# Patient Record
Sex: FEMALE | Race: WHITE | Employment: UNEMPLOYED | URBAN - METROPOLITAN AREA
[De-identification: names, ages, dates, MRNs, and addresses within clinical notes are randomized per-mention and may not be internally consistent; named-entity substitution may affect disease eponyms.]

---

## 2021-12-17 ENCOUNTER — OFFICE VISIT (OUTPATIENT)
Dept: ORTHOPEDIC SURGERY | Age: 41
End: 2021-12-17
Payer: MEDICAID

## 2021-12-17 VITALS
BODY MASS INDEX: 32.3 KG/M2 | HEIGHT: 70 IN | WEIGHT: 225.6 LBS | HEART RATE: 107 BPM | TEMPERATURE: 97 F | OXYGEN SATURATION: 98 %

## 2021-12-17 DIAGNOSIS — M25.551 RIGHT HIP PAIN: ICD-10-CM

## 2021-12-17 DIAGNOSIS — M54.50 LUMBAR PAIN: ICD-10-CM

## 2021-12-17 DIAGNOSIS — M70.61 TROCHANTERIC BURSITIS OF RIGHT HIP: Primary | ICD-10-CM

## 2021-12-17 PROCEDURE — 72170 X-RAY EXAM OF PELVIS: CPT | Performed by: PHYSICIAN ASSISTANT

## 2021-12-17 PROCEDURE — 20611 DRAIN/INJ JOINT/BURSA W/US: CPT | Performed by: PHYSICIAN ASSISTANT

## 2021-12-17 PROCEDURE — 99204 OFFICE O/P NEW MOD 45 MIN: CPT | Performed by: PHYSICIAN ASSISTANT

## 2021-12-17 PROCEDURE — 72100 X-RAY EXAM L-S SPINE 2/3 VWS: CPT | Performed by: PHYSICIAN ASSISTANT

## 2021-12-17 RX ORDER — BETAMETHASONE SODIUM PHOSPHATE AND BETAMETHASONE ACETATE 3; 3 MG/ML; MG/ML
6 INJECTION, SUSPENSION INTRA-ARTICULAR; INTRALESIONAL; INTRAMUSCULAR; SOFT TISSUE ONCE
Status: COMPLETED | OUTPATIENT
Start: 2021-12-17 | End: 2021-12-17

## 2021-12-17 RX ORDER — METHYLPREDNISOLONE 4 MG/1
TABLET ORAL
Qty: 1 DOSE PACK | Refills: 0 | Status: CANCELLED | OUTPATIENT
Start: 2021-12-17

## 2021-12-17 RX ORDER — MELOXICAM 15 MG/1
15 TABLET ORAL DAILY
Qty: 30 TABLET | Refills: 2 | Status: SHIPPED | OUTPATIENT
Start: 2021-12-17 | End: 2022-01-16

## 2021-12-17 RX ADMIN — BETAMETHASONE SODIUM PHOSPHATE AND BETAMETHASONE ACETATE 6 MG: 3; 3 INJECTION, SUSPENSION INTRA-ARTICULAR; INTRALESIONAL; INTRAMUSCULAR; SOFT TISSUE at 14:45

## 2021-12-17 NOTE — PROGRESS NOTES
[address] [facility] ([place], [address] [facility]  [phone]           Patient: Pierre Finney                MRN: [id number]       SSN: [id number]  YOB: 1980        AGE: 46 y.o. SEX: female  Body mass index is 32.37 kg/m². PCP: Unknown, Provider, MD  12/17/21            REVIEW OF SYSTEMS:  Constitutional: Negative for fever, chills, weight loss and malaise/fatigue. HENT: Negative. Eyes: Negative. Respiratory: Negative. Cardiovascular: Negative. Gastrointestinal: No bowel incontinence or constipation. Genitourinary: No bladder incontinence or saddle anesthesia. Skin: Negative. Neurological: Negative. Endo/Heme/Allergies: Negative. Psychiatric/Behavioral: Negative. Musculoskeletal: As per HPI above. History reviewed. No pertinent past medical history. No current outpatient medications on file. No Known Allergies    Social History     Socioeconomic History    Marital status: SINGLE     Spouse name: Not on file    Number of children: Not on file    Years of education: Not on file    Highest education level: Not on file   Occupational History    Not on file   Tobacco Use    Smoking status: Current Every Day Smoker     Packs/day: 1.00    Smokeless tobacco: Never Used   Vaping Use    Vaping Use: Never used   Substance and Sexual Activity    Alcohol use: Yes    Drug use: Not on file    Sexual activity: Not on file   Other Topics Concern    Not on file   Social History Narrative    Not on file     Social Determinants of Health     Financial Resource Strain:     Difficulty of Paying Living Expenses: Not on file   Food Insecurity:     Worried About Running Out of Food in the Last Year: Not on file    Vickie of Food in the Last Year: Not on file   Transportation Needs:     Lack of Transportation (Medical): Not on file    Lack of Transportation (Non-Medical):  Not on file   Physical Activity:     Days of Exercise per Week: Not on file    Minutes of Exercise per Session: Not on file   Stress:     Feeling of Stress : Not on file   Social Connections:     Frequency of Communication with Friends and Family: Not on file    Frequency of Social Gatherings with Friends and Family: Not on file    Attends Evangelical Services: Not on file    Active Member of Clubs or Organizations: Not on file    Attends Club or Organization Meetings: Not on file    Marital Status: Not on file   Intimate Partner Violence:     Fear of Current or Ex-Partner: Not on file    Emotionally Abused: Not on file    Physically Abused: Not on file    Sexually Abused: Not on file   Housing Stability:     Unable to Pay for Housing in the Last Year: Not on file    Number of [place] in the Last Year: Not on file    Unstable Housing in the Last Year: Not on file       History reviewed. No pertinent surgical history. Patient seen and evaluated today for pain advice regarding low back pain as well as right hip pain. She has a longstanding history of back issues. These have been worsening. She is getting discomfort into her right buttocks. She has discomfort when she is riding in her car as well as sitting down. She does have some discomfort at night which radiates down right lower extremity. No change in bowel bladder habits. She also reports having laterally based discomfort of the right hip which is worse with ambulation as well as lying on the right side at night. Denies any groin pain. No injuries. She is currently taking no anti-inflammatories. She works in [employer]. Patient denies recent fevers, chills, chest pain, SOB, or injuries. No recent systemic changes noted. A 12-point review of systems is performed today. Pertinent positives are noted. All other systems reviewed and otherwise are negative. Physical exam: General: Alert and oriented x3, nad.  well-developed, well nourished.   normal affect, AF.  NC/AT, EOMI, neck supple, trachea midline, no JVD present. Breathing is non-labored. Examination of the low back reveals skin intact. There is no erythema ecchymosis noted. There are no signs of infection or cellulitis present. She does have some discomfort to palpation in the lower lumbar spine and paraspinally. She has some discomfort slightly to the right SI joint. The pelvis is stable. There is no pain with range of motion of either hip. There is discomfort to palpation introversive the right side. Negative straight leg raise. Negative calf tenderness. Negative Homans. No signs of DVT present. Radiographs obtained the office today 12/17/2021 at the [facility] location include AP pelvis show no acute bony normalities as well as an AP and lateral of the lumbar spine showing multilevel degenerative changes and slight scoliosis. Assessment: #1 right hip trochanter bursitis #2 lumbar degenerative disc disease, radiculopathy    Plan: At this point, we discussed treatment options. We will move forward a course injection for the right hip, trochanteric bursa. After informed consent, under aseptic conditions, with US guided assitance, the right hip was prepped with betadine and a mxiture of 3ml 1% lidocaine and 6mg of celestone was injected without complications. The patient tolerated the injection well. The patient is instructed on post-injection care. We will move forward with an MRI of the lumbar spine for further evaluation of her radicular symptomatology. She has been on anti-inflammatories at home as well as done home exercise program which is failed. We will see her back after the MRI for further evaluation. We will start on Mobic 15 mg once a day with food. She is instructed on use as well as use precautions.         Chart reviewed for the following:  IMacy PA-C, have reviewed the History, Physical and updated the Allergic reactions for Stephany Soares?    TIME OUT performed immediately prior to start of procedure:  IRosa Maria PA-C, have performed the following reviews on Karthikeyan Almanza prior to the start of the procedure:  ????????  * Patient was identified by name and date of birth   * Agreement on procedure being performed was verified  * Risks and Benefits explained to the patient  * Procedure site verified and marked as necessary  * Patient was positioned for comfort  * Consent was signed and verified    Time:2:41 PM    Body part: right hip, intra-bursal    Medication & Dose: 3ml 1% lidocaine and 6mg celestone      Date of procedure: 12/17/21    Procedure performed by: Rosa Maria Gonzáles PA-C    Provider assisted by: none    Patient assisted by: self    How tolerated by patient: tolerated the procedure well with no complications    Post Procedural Pain Scale: 7    Comments:   [address] using a frequency of 10MHz with a 12L-RS transducer head was used to confirm needle placement.   Ultrasound images captured using [address] Ultrasound machine and scanned into patient's chart       Pawel Barnett PA-C, ATC

## 2022-01-03 ENCOUNTER — HOSPITAL ENCOUNTER (OUTPATIENT)
Age: 42
Discharge: HOME | End: 2022-01-03
Attending: PHYSICIAN ASSISTANT
Payer: MEDICAID

## 2022-01-03 DIAGNOSIS — M54.50 LUMBAR PAIN: ICD-10-CM

## 2022-01-03 PROCEDURE — 72148 MRI LUMBAR SPINE W/O DYE: CPT

## 2022-01-07 ENCOUNTER — OFFICE VISIT (OUTPATIENT)
Dept: ORTHOPEDIC SURGERY | Age: 42
End: 2022-01-07
Payer: MEDICAID

## 2022-01-07 VITALS — OXYGEN SATURATION: 99 % | BODY MASS INDEX: 33.43 KG/M2 | WEIGHT: 233 LBS | HEART RATE: 92 BPM | TEMPERATURE: 97.6 F

## 2022-01-07 DIAGNOSIS — M54.50 LUMBAR PAIN: Primary | ICD-10-CM

## 2022-01-07 DIAGNOSIS — M48.062 SPINAL STENOSIS OF LUMBAR REGION WITH NEUROGENIC CLAUDICATION: ICD-10-CM

## 2022-01-07 DIAGNOSIS — M54.16 LUMBAR RADICULOPATHY: ICD-10-CM

## 2022-01-07 DIAGNOSIS — M16.11 PRIMARY OSTEOARTHRITIS OF RIGHT HIP: ICD-10-CM

## 2022-01-07 DIAGNOSIS — M25.551 RIGHT HIP PAIN: ICD-10-CM

## 2022-01-07 PROCEDURE — 99214 OFFICE O/P EST MOD 30 MIN: CPT | Performed by: ORTHOPAEDIC SURGERY

## 2022-01-07 RX ORDER — ALBUTEROL SULFATE 90 UG/1
AEROSOL, METERED RESPIRATORY (INHALATION)
COMMUNITY
Start: 2021-08-20 | End: 2022-09-09 | Stop reason: CLARIF

## 2022-01-07 NOTE — PROGRESS NOTES
Patient: Amanda Werner                MRN: [id number]       SSN: [id number]  YOB: 1980        AGE: 46 y.o. SEX: female  Body mass index is 33.43 kg/m². PCP: Unknown, Provider, KENNETH  01/07/22    HISTORY:  Saira Nava returns for a follow up assessment regarding radicular pain, low back pain. She works as a  and has been doing it for about 20 years. Also a little bit of right hip pain. She has more difficulty putting her underwear on or sock on, on the right side. No bowel or bladder problems. She received a bursal injection from my assistant and that actually helped a fair bit. But still, low back pain is moderate. She is on Mobic. She does get some radiculopathy down the leg from time to time. PHYSICAL EXAMINATION:  She holds her back a bit stiffly. The left hip rotates normally. The right hip is a touch stiff with internal rotation, but not severely so. She is missing about 10 degrees of internal rotation with flexion. Impingement sign is equivocal.    There is no foot drop, but she has mild decrease in sensation to L4 and 5. Tra [place]' sign is negative and both feet warm and well perfused. She is very pleasant. RADIOGRAPHS:  MRI of the lumbar spine shows that she has fairly severe central canal stenosis at 4-5. PLAN:  I would like her to see Dr. Leida Vora. In addition, I reviewed her AP pelvis and she has some dysplasia and also it looks like some pincer impingement, a little bit of shadowing in the femoral head and just very mild arthritis. I would like to get an MRI for the right hip as well. She is not having catching, locking, giving way, so I think just a plain MRI would be acceptable. We will see her back after the MRI of the hip and I am going to get her to see Dr. Leida Vora for the lumbar spine. There was a discussion regarding surgery and hopefully we can manage her nonoperatively for the time being.   It has been a pleasure to share in her care.        REVIEW OF SYSTEMS:      CON: negative  EYE: negative   ENT: negative  RESP: negative  GI:    negative   :  negative  MSK: Positive  A twelve point review of systems was completed, positives noted and all other systems were reviewed and are negative          History reviewed. No pertinent past medical history. Family History   Problem Relation Age of Onset    Diabetes Mother     OSTEOARTHRITIS Mother     Obesity Mother     Obesity Father     OSTEOARTHRITIS Father        Current Outpatient Medications   Medication Sig Dispense Refill    albuterol (PROVENTIL HFA, VENTOLIN HFA, PROAIR HFA) 90 mcg/actuation inhaler 2 puffs every 4 hours as needed      meloxicam (Mobic) 15 mg tablet Take 1 Tablet by mouth daily for 30 days. 30 Tablet 2       No Known Allergies    History reviewed. No pertinent surgical history. Social History     Socioeconomic History    Marital status: SINGLE     Spouse name: Not on file    Number of children: Not on file    Years of education: Not on file    Highest education level: Not on file   Occupational History    Not on file   Tobacco Use    Smoking status: Current Every Day Smoker     Packs/day: 1.00    Smokeless tobacco: Never Used   Vaping Use    Vaping Use: Never used   Substance and Sexual Activity    Alcohol use: Yes    Drug use: Not on file    Sexual activity: Not on file   Other Topics Concern    Not on file   Social History Narrative    Not on file     Social Determinants of Health     Financial Resource Strain:     Difficulty of Paying Living Expenses: Not on file   Food Insecurity:     Worried About Running Out of Food in the Last Year: Not on file    Vickie of Food in the Last Year: Not on file   Transportation Needs:     Lack of Transportation (Medical): Not on file    Lack of Transportation (Non-Medical):  Not on file   Physical Activity:     Days of Exercise per Week: Not on file    Minutes of Exercise per Session: Not on file Stress:     Feeling of Stress : Not on file   Social Connections:     Frequency of Communication with Friends and Family: Not on file    Frequency of Social Gatherings with Friends and Family: Not on file    Attends Hinduism Services: Not on file    Active Member of [address] [place] [address] or Organizations: Not on file    Attends Club or Organization Meetings: Not on file    Marital Status: Not on file   Intimate Partner Violence:     Fear of Current or Ex-Partner: Not on file    Emotionally Abused: Not on file    Physically Abused: Not on file    Sexually Abused: Not on file   Housing Stability:     Unable to Pay for Housing in the Last Year: Not on file    Number of [place] in the Last Year: Not on file    Unstable Housing in the Last Year: Not on file       Visit Vitals  Pulse 92   Temp 97.6 °F (36.4 °C) (Temporal)   Wt 105.7 kg (233 lb)   LMP 12/29/2021   SpO2 99%   BMI 33.43 kg/m²         PHYSICAL EXAMINATION:  GENERAL: Alert and oriented x3, in no acute distress, well-developed, well-nourished, afebrile. HEART: No JVD. EYES: No scleral icterus   NECK: No significant lymphadenopathy   LUNGS: No respiratory compromise or indrawing  ABDOMEN: Soft, non-tender, non-distended. Note: This note was completed using voice recognition software. Any typographical/name errors or mistakes are unintentional.    Electronically signed by:  Dulce Ocampo MD

## 2022-01-20 ENCOUNTER — HOSPITAL ENCOUNTER (OUTPATIENT)
Age: 42
Discharge: HOME | End: 2022-01-20
Attending: ORTHOPAEDIC SURGERY
Payer: MEDICAID

## 2022-01-20 DIAGNOSIS — M16.11 PRIMARY OSTEOARTHRITIS OF RIGHT HIP: ICD-10-CM

## 2022-01-20 DIAGNOSIS — M25.551 RIGHT HIP PAIN: ICD-10-CM

## 2022-01-20 PROCEDURE — 73721 MRI JNT OF LWR EXTRE W/O DYE: CPT

## 2022-02-11 ENCOUNTER — OFFICE VISIT (OUTPATIENT)
Dept: ORTHOPEDIC SURGERY | Age: 42
End: 2022-02-11
Payer: MEDICAID

## 2022-02-11 VITALS — WEIGHT: 233 LBS | OXYGEN SATURATION: 98 % | HEART RATE: 110 BPM | HEIGHT: 70 IN | BODY MASS INDEX: 33.36 KG/M2

## 2022-02-11 DIAGNOSIS — M70.61 TROCHANTERIC BURSITIS OF RIGHT HIP: ICD-10-CM

## 2022-02-11 DIAGNOSIS — M48.062 SPINAL STENOSIS OF LUMBAR REGION WITH NEUROGENIC CLAUDICATION: ICD-10-CM

## 2022-02-11 DIAGNOSIS — M54.50 LUMBAR PAIN: Primary | ICD-10-CM

## 2022-02-11 DIAGNOSIS — M54.16 LUMBAR RADICULOPATHY: ICD-10-CM

## 2022-02-11 PROCEDURE — 99214 OFFICE O/P EST MOD 30 MIN: CPT | Performed by: ORTHOPAEDIC SURGERY

## 2022-02-11 NOTE — PROGRESS NOTES
Patient: Ramonita Sharpe                MRN: [id number]       SSN: [id number]  YOB: 1980        AGE: 46 y.o. SEX: female  Body mass index is 33.43 kg/m². PCP: Unknown, Provider, KENNETH  02/11/22    Nate returns for reassessment of low back pain also right hip pain when he came into the room she was actually lying on her side as it that hurts to sit for long time she does get some radicular pain going down the legs no bowel or bladder problems denies fevers or chills otherwise been feeling well and she has had 1 cortisone injection for the bursitis not much in the way of groin pain not too much in the way of catching locking or giving way occasional chills feel the IT band snap but it's fairly rare.     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 and MRI reviewed of the hip without contrast confirm some pincer impingement no avascular necrosis    At this point she is going to do some yoga and some stretching which I would like her to do as well Maurice as well we could consider physical therapy repeat injection in the future its been a pleasure to share in her care there was a discussion regarding surgery was decided is not currently recommended if she starts having worsening of groin discomfort or catching locking mechanical symptoms we could do MRI with contrast of her lip looking for labral tear but I think this is less likely at this point having said this we'll keep an eye open for her    REVIEW OF SYSTEMS:      CON: negative  EYE: negative   ENT: negative  RESP: negative  GI:    negative   :  negative  MSK: Positive  A twelve point review of systems was completed, positives noted and all other systems were reviewed and are negative          History reviewed. No pertinent past medical history. Family History   Problem Relation Age of Onset    Diabetes Mother     OSTEOARTHRITIS Mother     Obesity Mother     Obesity Father     OSTEOARTHRITIS Father        Current Outpatient Medications   Medication Sig Dispense Refill    albuterol (PROVENTIL HFA, VENTOLIN HFA, PROAIR HFA) 90 mcg/actuation inhaler 2 puffs every 4 hours as needed         No Known Allergies    History reviewed. No pertinent surgical history. Social History     Socioeconomic History    Marital status: SINGLE     Spouse name: Not on file    Number of children: Not on file    Years of education: Not on file    Highest education level: Not on file   Occupational History    Not on file   Tobacco Use    Smoking status: Current Every Day Smoker     Packs/day: 1.00    Smokeless tobacco: Never Used   Vaping Use    Vaping Use: Never used   Substance and Sexual Activity    Alcohol use: Yes    Drug use: Not on file    Sexual activity: Not on file   Other Topics Concern    Not on file   Social History Narrative    Not on file     Social Determinants of Health     Financial Resource Strain:     Difficulty of Paying Living Expenses: Not on file   Food Insecurity:     Worried About Running Out of Food in the Last Year: Not on file    Vickie of Food in the Last Year: Not on file   Transportation Needs:     Lack of Transportation (Medical): Not on file    Lack of Transportation (Non-Medical):  Not on file   Physical Activity:     Days of Exercise per Week: Not on file    Minutes of Exercise per Session: Not on file   Stress:     Feeling of Stress : Not on file   Social Connections:     Frequency of Communication with Friends and Family: Not on file    Frequency of Social Gatherings with Friends and Family: Not on file    Attends Pentecostal Services: Not on file    Active Member of Clubs or Organizations: Not on file    Attends Club or Organization Meetings: Not on file    Marital Status: Not on file   Intimate Partner Violence:     Fear of Current or Ex-Partner: Not on file    Emotionally Abused: Not on file    Physically Abused: Not on file    Sexually Abused: Not on file   Housing Stability:     Unable to Pay for Housing in the Last Year: Not on file    Number of [place] in the Last Year: Not on file    Unstable Housing in the Last Year: Not on file       Visit Vitals  Pulse (!) 110   Ht 5' 10\" (1.778 m)   Wt 105.7 kg (233 lb)   SpO2 98%   BMI 33.43 kg/m²         PHYSICAL EXAMINATION:  GENERAL: Alert and oriented x3, in no acute distress, well-developed, well-nourished, afebrile. HEART: No JVD. EYES: No scleral icterus   NECK: No significant lymphadenopathy   LUNGS: No respiratory compromise or indrawing  ABDOMEN: Soft, non-tender, non-distended. Note: This note was completed using voice recognition software. Any typographical/name errors or mistakes are unintentional.    Electronically signed by:  Madelin Stone MD

## 2022-03-30 ENCOUNTER — TELEPHONE (OUTPATIENT)
Dept: ORTHOPEDIC SURGERY | Age: 42
End: 2022-03-30

## 2022-03-30 NOTE — TELEPHONE ENCOUNTER
Patient called asking if she could get authorization for surgery,patient fell & fracture left ankle. Patient went to [facility] & have hard copies of ekg,x-ray of ankle,chest x-ray,uriane & blood work.  Patient contact #[phone]

## 2022-03-31 NOTE — TELEPHONE ENCOUNTER
Patient was sent to wrong place yesterday but now has the correct physician to treat her for her fx ankle